# Patient Record
Sex: FEMALE | Race: WHITE | NOT HISPANIC OR LATINO | URBAN - METROPOLITAN AREA
[De-identification: names, ages, dates, MRNs, and addresses within clinical notes are randomized per-mention and may not be internally consistent; named-entity substitution may affect disease eponyms.]

---

## 2019-06-10 ENCOUNTER — EMERGENCY (EMERGENCY)
Facility: HOSPITAL | Age: 59
LOS: 1 days | Discharge: HOME | End: 2019-06-10
Attending: EMERGENCY MEDICINE
Payer: COMMERCIAL

## 2019-06-10 VITALS
WEIGHT: 166.89 LBS | SYSTOLIC BLOOD PRESSURE: 138 MMHG | DIASTOLIC BLOOD PRESSURE: 75 MMHG | HEART RATE: 58 BPM | HEIGHT: 64 IN | OXYGEN SATURATION: 100 % | TEMPERATURE: 98 F | RESPIRATION RATE: 16 BRPM

## 2019-06-10 VITALS
HEART RATE: 61 BPM | SYSTOLIC BLOOD PRESSURE: 107 MMHG | TEMPERATURE: 98 F | DIASTOLIC BLOOD PRESSURE: 60 MMHG | OXYGEN SATURATION: 99 % | RESPIRATION RATE: 16 BRPM

## 2019-06-10 PROCEDURE — 73600 X-RAY EXAM OF ANKLE: CPT

## 2019-06-10 PROCEDURE — 73590 X-RAY EXAM OF LOWER LEG: CPT | Mod: 26,RT,76

## 2019-06-10 PROCEDURE — 99285 EMERGENCY DEPT VISIT HI MDM: CPT | Mod: 25

## 2019-06-10 PROCEDURE — 99285 EMERGENCY DEPT VISIT HI MDM: CPT

## 2019-06-10 PROCEDURE — 73610 X-RAY EXAM OF ANKLE: CPT | Mod: 26,RT

## 2019-06-10 PROCEDURE — 73700 CT LOWER EXTREMITY W/O DYE: CPT

## 2019-06-10 PROCEDURE — 73562 X-RAY EXAM OF KNEE 3: CPT

## 2019-06-10 PROCEDURE — 27818 TREATMENT OF ANKLE FRACTURE: CPT | Mod: RT

## 2019-06-10 PROCEDURE — 76377 3D RENDER W/INTRP POSTPROCES: CPT | Mod: 26

## 2019-06-10 PROCEDURE — 73630 X-RAY EXAM OF FOOT: CPT

## 2019-06-10 PROCEDURE — 73630 X-RAY EXAM OF FOOT: CPT | Mod: 26,RT

## 2019-06-10 PROCEDURE — 73610 X-RAY EXAM OF ANKLE: CPT

## 2019-06-10 PROCEDURE — 73590 X-RAY EXAM OF LOWER LEG: CPT

## 2019-06-10 PROCEDURE — 99284 EMERGENCY DEPT VISIT MOD MDM: CPT

## 2019-06-10 PROCEDURE — 96374 THER/PROPH/DIAG INJ IV PUSH: CPT | Mod: XU

## 2019-06-10 PROCEDURE — 76377 3D RENDER W/INTRP POSTPROCES: CPT

## 2019-06-10 PROCEDURE — 73030 X-RAY EXAM OF SHOULDER: CPT | Mod: 26,RT

## 2019-06-10 PROCEDURE — 73562 X-RAY EXAM OF KNEE 3: CPT | Mod: 26,RT

## 2019-06-10 PROCEDURE — 73700 CT LOWER EXTREMITY W/O DYE: CPT | Mod: 26,RT

## 2019-06-10 PROCEDURE — 73030 X-RAY EXAM OF SHOULDER: CPT

## 2019-06-10 RX ORDER — ACETAMINOPHEN 500 MG
650 TABLET ORAL ONCE
Refills: 0 | Status: COMPLETED | OUTPATIENT
Start: 2019-06-10 | End: 2019-06-10

## 2019-06-10 RX ORDER — OXYCODONE HYDROCHLORIDE 5 MG/1
1 TABLET ORAL
Qty: 12 | Refills: 0
Start: 2019-06-10 | End: 2019-06-12

## 2019-06-10 RX ORDER — OXYCODONE HYDROCHLORIDE 5 MG/1
5 TABLET ORAL ONCE
Refills: 0 | Status: DISCONTINUED | OUTPATIENT
Start: 2019-06-10 | End: 2019-06-10

## 2019-06-10 RX ORDER — MORPHINE SULFATE 50 MG/1
4 CAPSULE, EXTENDED RELEASE ORAL ONCE
Refills: 0 | Status: DISCONTINUED | OUTPATIENT
Start: 2019-06-10 | End: 2019-06-10

## 2019-06-10 RX ADMIN — Medication 650 MILLIGRAM(S): at 12:17

## 2019-06-10 RX ADMIN — MORPHINE SULFATE 4 MILLIGRAM(S): 50 CAPSULE, EXTENDED RELEASE ORAL at 16:36

## 2019-06-10 RX ADMIN — OXYCODONE HYDROCHLORIDE 5 MILLIGRAM(S): 5 TABLET ORAL at 12:17

## 2019-06-10 RX ADMIN — Medication 650 MILLIGRAM(S): at 14:31

## 2019-06-10 RX ADMIN — MORPHINE SULFATE 4 MILLIGRAM(S): 50 CAPSULE, EXTENDED RELEASE ORAL at 14:31

## 2019-06-10 RX ADMIN — OXYCODONE HYDROCHLORIDE 5 MILLIGRAM(S): 5 TABLET ORAL at 14:31

## 2019-06-10 NOTE — ED PROVIDER NOTE - PHYSICAL EXAMINATION
General: well appearing, interactive, well nourished, no apparent distress  HEENT: EOMI, PERRLA, normal mucosa, normal oropharynx, no lesions on the lips or on oral mucosa, normal external ear  Neck: supple, no lymphadenopathy, full range of motion, no nuchal rigidity  CV: RRR, normal S1 and S2 with no murmur, capillary refill less than two seconds  Resp: lungs CTA b/l, good aeration bilaterally, symmetric chest wall   Abd: non-distended, soft, non-tender  : no CVA tenderness  MSK: r ankle circumferentially swollen, no ecchymoses or gross deformities, sensation intact to light touch in r foot, <2 second capillary refill in r foot, no gross deformities or ttp of r knee or tib/fib  Neuro: muscle strength 5/5 in all extremities  Skin: no rashes, skin intact

## 2019-06-10 NOTE — ED PROVIDER NOTE - CARE PLAN
Principal Discharge DX:	Ankle fracture Principal Discharge DX:	Ankle fracture  Goal:	Comminuted fracture of left distal fibula and tibia without dislocation

## 2019-06-10 NOTE — ED ADULT NURSE NOTE - OBJECTIVE STATEMENT
58 y.o female c c/o R ankle injury. Pt arrived via EMS. R ankle injury s/p falling over dog/baby gate. edema to R ankle injury noted. Pt able to move toes and no paresthesias noted. +pedal pulse.

## 2019-06-10 NOTE — ED PROVIDER NOTE - CARE PROVIDER_API CALL
French Hancock (MD)  Orthopaedic Surgery  [address] [place] [address], [address]  [place] [address]  Phone: [phone]  Fax: [phone]  Follow Up Time:

## 2019-06-10 NOTE — CONSULT NOTE ADULT - ASSESSMENT
A/P: 58yFemale with R ankle fracture s/p closed reduction and splinting  - Pain control  - NWB on affected extremity in splint  - Keep splint clean, dry and intact until follow up  - Cane/crutches/walker as needed  - Ice/elevation  - FU CT scan RLE  - Follow up with Dr. Hancock in 1 week at next available appointment. [phone]

## 2019-06-10 NOTE — ED PROVIDER NOTE - NSFOLLOWUPINSTRUCTIONS_ED_ALL_ED_FT
1) Please follow up with your Primary Care Provider in 24-48 hours  2) Seek immediate medical care for any new or returning symptoms including but not limited high fevers, severe pain, difficulty walking  3) Take Tylenol 650 mg every 4-6 hours as needed for pain. Do not take more than 2 grams within a 24 hour period  4) Take Ibuprofen 400-600 mg every 4-6 hours as needed for pain. Do not take more than 1200 mg within a 24 hour period. Take this medication with food  5) Take Oxycodone 5 mg every 6 hours as needed for breakthrough pain. Do not operate heavy machinery or make critical decisions when taking this medication 1) Please follow up with Orthopedics within 5-7 days  2) Seek immediate medical care for any new or returning symptoms including but not limited high fevers, severe pain, difficulty walking  3) Take Tylenol 650 mg every 4-6 hours as needed for pain. Do not take more than 2 grams within a 24 hour period  4) Take Ibuprofen 400-600 mg every 4-6 hours as needed for pain. Do not take more than 1200 mg within a 24 hour period. Take this medication with food  5) Take Oxycodone 5 mg every 6 hours as needed for breakthrough pain. Do not operate heavy machinery or make critical decisions when taking this medication

## 2019-06-10 NOTE — ED PROVIDER NOTE - NS ED ROS FT
GENERAL: No fever or chills, //             EYES: no change in vision, //             HEENT: no trouble swallowing or speaking, //             CARDIAC: no chest pain, //              PULMONARY: no cough or SOB, //             GI: no abdominal pain, no nausea or no vomiting, no diarrhea or constipation, //             : No changes in urination,  //            SKIN: no rashes,  //            NEURO: no headache,  //             MSK: r ankle pain. ~Timbo Matta DO PGY1

## 2019-06-10 NOTE — CONSULT NOTE ADULT - SUBJECTIVE AND OBJECTIVE BOX
Orthopedic Surgery Consult Note      57 y/o F community ambulator sp mechanical fall this morning while trying to get over a fence. She felt her right ankle twist and immediately felt pain and inability to bear weight. Denies numbness/tingling. Denies other injury. Denies syncope. Denies Head trauma        Vital Signs Last 24 Hrs  T(C): 36.8 (06-10-19 @ 11:02), Max: 36.8 (06-10-19 @ 11:02)  T(F): 98.2 (06-10-19 @ 11:02), Max: 98.2 (06-10-19 @ 11:02)  HR: 61 (06-10-19 @ 12:18) (58 - 61)  BP: 138/75 (06-10-19 @ 11:02) (138/75 - 138/75)  BP(mean): --  RR: 16 (06-10-19 @ 11:02) (16 - 16)  SpO2: 100% (06-10-19 @ 11:02) (100% - 100%)    Physical Exam  Gen: Nad  R/L LE: Skin intact, +skin tenting medially +TTP medial/lateral malleolus  motor intact distally  SILT s/s/sp/dp/t  2+ DP    Imaging:  XR showing trimalleolar R ankle fracture    Procedure: Closed reduction performed followed by placement of a well padded trilam splint. Patient tolerated the procedure well. Post procedure imaging obtained and showed improved alignment. Post procedure the patient was NV intact.    A/P: 58yFemale with R ankle fracture s/p closed reduction and splinting  - Pain control  - NWB on affected extremity in splint  - Keep splint clean, dry and intact until follow up  - Cane/crutches/walker as needed  - Ice/elevation  - CU CT scan RLE Orthopedic Surgery Consult Note      57 y/o F community ambulator sp mechanical fall this morning while trying to get over a fence. She felt her right ankle twist and immediately felt pain and inability to bear weight. Denies numbness/tingling. Denies other injury. Denies syncope. Denies Head trauma        Vital Signs Last 24 Hrs  T(C): 36.8 (06-10-19 @ 11:02), Max: 36.8 (06-10-19 @ 11:02)  T(F): 98.2 (06-10-19 @ 11:02), Max: 98.2 (06-10-19 @ 11:02)  HR: 61 (06-10-19 @ 12:18) (58 - 61)  BP: 138/75 (06-10-19 @ 11:02) (138/75 - 138/75)  BP(mean): --  RR: 16 (06-10-19 @ 11:02) (16 - 16)  SpO2: 100% (06-10-19 @ 11:02) (100% - 100%)    Physical Exam  Gen: Nad  R/L LE: Skin intact, +skin tenting medially +TTP medial/lateral malleolus  motor intact distally  SILT s/s/sp/dp/t  2+ DP    Imaging:  XR showing trimalleolar R ankle fracture    Procedure: Closed reduction performed followed by placement of a well padded trilam splint. Patient tolerated the procedure well. Post procedure imaging obtained and showed improved alignment. Post procedure the patient was NV intact.

## 2019-06-10 NOTE — ED ADULT NURSE NOTE - PMH
Cardiac murmur    Cervical cancer  s/p LEEP procedure 8/12  Chronic obstructive pulmonary disease (COPD)    Herniated disc    Prediabetes    Torn meniscus

## 2019-06-10 NOTE — ED ADULT NURSE NOTE - PSH
S/P appendectomy  age 14  S/P arthroscopy of left knee  multiple  S/P knee surgery  20+ years ago high fibula osteotomy left  S/P laparotomy  ovarian cystectomy  S/P TKR (total knee replacement)  left 12/13/12

## 2019-06-10 NOTE — ED PROVIDER NOTE - PROGRESS NOTE DETAILS
Patient reassessed, NAD, non-toxic appearing. tri-mal frac. ortho consulted. more pain medication given. splinted. will order ct per ortho.   - Timbo Matta D.O. PGY1 Patient reassessed, NAD, non-toxic appearing. ambulatory with crutches. no admission per ortho. to f/u. [place] with strict return precautions.

## 2019-06-10 NOTE — ED PROVIDER NOTE - OBJECTIVE STATEMENT
59 yo f pmh ibm, pw r ankle pain. pt states this morning she suffered mechanical fall at home, causing a potential inversion injury to r ankle. states she felt immediate pain to area and a "pop". pain is 10/10, localized to ankle, worse with pressure, better with rest. denies syncope, head trauma, n/v, f/c, cp, sob.

## 2019-06-10 NOTE — ED PROVIDER NOTE - CLINICAL SUMMARY MEDICAL DECISION MAKING FREE TEXT BOX
59 yo f pw traumatic, mechanical, non-syncopal fall with r ankle pain. xray, pain control. reassess.

## 2019-06-10 NOTE — ED PROVIDER NOTE - ATTENDING CONTRIBUTION TO CARE
I have seen and evaluated this patient with the resident.   I agree with the findings  unless other wise stated.  I have made appropriate changes in documentations where needed, After my face to face bedside evaluation, I am further  [age] yo f pmh ibm, pw r ankle pain. pt states this morning she suffered mechanical fall at home, causing a potential inversion injury to r ankle. states she felt immediate pain to area and a "pop". pain is 10/10, localized to ankle, worse with pressure, better with rest. denies syncope, head trauma, n/v, f/c, cp, sob. 57 yo f pw traumatic, mechanical, non-syncopal fall with r ankle pain. xray, pain control. reassess. ankle swelling tender unstable unable to bear weight xrays and CT scans review Pt will be d/c and will have eval and surgery done by Dr Hancock  --Sauceda

## 2019-06-14 ENCOUNTER — TRANSCRIPTION ENCOUNTER (OUTPATIENT)
Age: 59
End: 2019-06-14

## 2019-06-14 RX ORDER — CEFAZOLIN SODIUM 1 G
2000 VIAL (EA) INJECTION ONCE
Refills: 0 | Status: DISCONTINUED | OUTPATIENT
Start: 2019-06-15 | End: 2019-06-18

## 2019-06-15 ENCOUNTER — INPATIENT (INPATIENT)
Facility: HOSPITAL | Age: 59
LOS: 2 days | Discharge: HOME | DRG: 494 | End: 2019-06-18
Attending: ORTHOPAEDIC SURGERY | Admitting: ORTHOPAEDIC SURGERY
Payer: COMMERCIAL

## 2019-06-15 VITALS
TEMPERATURE: 98 F | OXYGEN SATURATION: 97 % | SYSTOLIC BLOOD PRESSURE: 135 MMHG | WEIGHT: 166.01 LBS | HEART RATE: 83 BPM | DIASTOLIC BLOOD PRESSURE: 78 MMHG | RESPIRATION RATE: 18 BRPM

## 2019-06-15 DIAGNOSIS — S82.851A DISPLACED TRIMALLEOLAR FRACTURE OF RIGHT LOWER LEG, INITIAL ENCOUNTER FOR CLOSED FRACTURE: ICD-10-CM

## 2019-06-15 LAB
ANION GAP SERPL CALC-SCNC: 13 MMOL/L — SIGNIFICANT CHANGE UP (ref 5–17)
BASE EXCESS BLDV CALC-SCNC: 3.2 MMOL/L — HIGH (ref -2–2)
BASOPHILS # BLD AUTO: 0 K/UL — SIGNIFICANT CHANGE UP (ref 0–0.2)
BASOPHILS NFR BLD AUTO: 0 % — SIGNIFICANT CHANGE UP (ref 0–2)
BUN SERPL-MCNC: 15 MG/DL — SIGNIFICANT CHANGE UP (ref 7–23)
CA-I SERPL-SCNC: 1.11 MMOL/L — LOW (ref 1.12–1.3)
CALCIUM SERPL-MCNC: 9 MG/DL — SIGNIFICANT CHANGE UP (ref 8.4–10.5)
CHLORIDE BLDV-SCNC: 106 MMOL/L — SIGNIFICANT CHANGE UP (ref 96–108)
CHLORIDE SERPL-SCNC: 99 MMOL/L — SIGNIFICANT CHANGE UP (ref 96–108)
CO2 BLDV-SCNC: 29 MMOL/L — SIGNIFICANT CHANGE UP (ref 22–30)
CO2 SERPL-SCNC: 25 MMOL/L — SIGNIFICANT CHANGE UP (ref 22–31)
CREAT SERPL-MCNC: 0.66 MG/DL — SIGNIFICANT CHANGE UP (ref 0.5–1.3)
EOSINOPHIL # BLD AUTO: 0 K/UL — SIGNIFICANT CHANGE UP (ref 0–0.5)
EOSINOPHIL NFR BLD AUTO: 0.2 % — SIGNIFICANT CHANGE UP (ref 0–6)
GAS PNL BLDV: 134 MMOL/L — LOW (ref 136–145)
GAS PNL BLDV: SIGNIFICANT CHANGE UP
GAS PNL BLDV: SIGNIFICANT CHANGE UP
GLUCOSE BLDC GLUCOMTR-MCNC: 116 MG/DL — HIGH (ref 70–99)
GLUCOSE BLDV-MCNC: 113 MG/DL — HIGH (ref 70–99)
GLUCOSE SERPL-MCNC: 172 MG/DL — HIGH (ref 70–99)
HCO3 BLDV-SCNC: 28 MMOL/L — SIGNIFICANT CHANGE UP (ref 21–29)
HCT VFR BLD CALC: 38 % — SIGNIFICANT CHANGE UP (ref 34.5–45)
HCT VFR BLDA CALC: 38 % — LOW (ref 39–50)
HGB BLD CALC-MCNC: 12.2 G/DL — SIGNIFICANT CHANGE UP (ref 11.5–15.5)
HGB BLD-MCNC: 13.1 G/DL — SIGNIFICANT CHANGE UP (ref 11.5–15.5)
LACTATE BLDV-MCNC: 1 MMOL/L — SIGNIFICANT CHANGE UP (ref 0.7–2)
LYMPHOCYTES # BLD AUTO: 0.4 K/UL — LOW (ref 1–3.3)
LYMPHOCYTES # BLD AUTO: 4.3 % — LOW (ref 13–44)
MCHC RBC-ENTMCNC: 30.8 PG — SIGNIFICANT CHANGE UP (ref 27–34)
MCHC RBC-ENTMCNC: 34.3 GM/DL — SIGNIFICANT CHANGE UP (ref 32–36)
MCV RBC AUTO: 89.7 FL — SIGNIFICANT CHANGE UP (ref 80–100)
MONOCYTES # BLD AUTO: 0.1 K/UL — SIGNIFICANT CHANGE UP (ref 0–0.9)
MONOCYTES NFR BLD AUTO: 1.2 % — LOW (ref 2–14)
NEUTROPHILS # BLD AUTO: 9.5 K/UL — HIGH (ref 1.8–7.4)
NEUTROPHILS NFR BLD AUTO: 94.2 % — HIGH (ref 43–77)
OTHER CELLS CSF MANUAL: 16 ML/DL — LOW (ref 18–22)
PCO2 BLDV: 44 MMHG — SIGNIFICANT CHANGE UP (ref 35–50)
PH BLDV: 7.42 — SIGNIFICANT CHANGE UP (ref 7.35–7.45)
PLATELET # BLD AUTO: 288 K/UL — SIGNIFICANT CHANGE UP (ref 150–400)
PO2 BLDV: 86 MMHG — HIGH (ref 25–45)
POTASSIUM BLDV-SCNC: 3.8 MMOL/L — SIGNIFICANT CHANGE UP (ref 3.5–5.3)
POTASSIUM SERPL-MCNC: 4.3 MMOL/L — SIGNIFICANT CHANGE UP (ref 3.5–5.3)
POTASSIUM SERPL-SCNC: 4.3 MMOL/L — SIGNIFICANT CHANGE UP (ref 3.5–5.3)
RBC # BLD: 4.24 M/UL — SIGNIFICANT CHANGE UP (ref 3.8–5.2)
RBC # FLD: 10.9 % — SIGNIFICANT CHANGE UP (ref 10.3–14.5)
SAO2 % BLDV: 97 % — HIGH (ref 67–88)
SODIUM SERPL-SCNC: 137 MMOL/L — SIGNIFICANT CHANGE UP (ref 135–145)
WBC # BLD: 10 K/UL — SIGNIFICANT CHANGE UP (ref 3.8–10.5)
WBC # FLD AUTO: 10 K/UL — SIGNIFICANT CHANGE UP (ref 3.8–10.5)

## 2019-06-15 PROCEDURE — 73610 X-RAY EXAM OF ANKLE: CPT | Mod: 26,RT

## 2019-06-15 RX ORDER — ONDANSETRON 8 MG/1
4 TABLET, FILM COATED ORAL ONCE
Refills: 0 | Status: DISCONTINUED | OUTPATIENT
Start: 2019-06-15 | End: 2019-06-15

## 2019-06-15 RX ORDER — HYDROMORPHONE HYDROCHLORIDE 2 MG/ML
0.5 INJECTION INTRAMUSCULAR; INTRAVENOUS; SUBCUTANEOUS
Refills: 0 | Status: DISCONTINUED | OUTPATIENT
Start: 2019-06-15 | End: 2019-06-15

## 2019-06-15 RX ORDER — OXYCODONE HYDROCHLORIDE 5 MG/1
5 TABLET ORAL EVERY 4 HOURS
Refills: 0 | Status: DISCONTINUED | OUTPATIENT
Start: 2019-06-15 | End: 2019-06-18

## 2019-06-15 RX ORDER — SODIUM CHLORIDE 9 MG/ML
1000 INJECTION, SOLUTION INTRAVENOUS
Refills: 0 | Status: DISCONTINUED | OUTPATIENT
Start: 2019-06-15 | End: 2019-06-18

## 2019-06-15 RX ORDER — ASPIRIN/CALCIUM CARB/MAGNESIUM 324 MG
325 TABLET ORAL
Refills: 0 | Status: DISCONTINUED | OUTPATIENT
Start: 2019-06-15 | End: 2019-06-18

## 2019-06-15 RX ORDER — SODIUM CHLORIDE 9 MG/ML
3 INJECTION INTRAMUSCULAR; INTRAVENOUS; SUBCUTANEOUS EVERY 8 HOURS
Refills: 0 | Status: DISCONTINUED | OUTPATIENT
Start: 2019-06-15 | End: 2019-06-15

## 2019-06-15 RX ORDER — ONDANSETRON 8 MG/1
4 TABLET, FILM COATED ORAL EVERY 6 HOURS
Refills: 0 | Status: DISCONTINUED | OUTPATIENT
Start: 2019-06-15 | End: 2019-06-15

## 2019-06-15 RX ORDER — MAGNESIUM HYDROXIDE 400 MG/1
30 TABLET, CHEWABLE ORAL DAILY
Refills: 0 | Status: DISCONTINUED | OUTPATIENT
Start: 2019-06-15 | End: 2019-06-18

## 2019-06-15 RX ORDER — OXYCODONE HYDROCHLORIDE 5 MG/1
10 TABLET ORAL EVERY 4 HOURS
Refills: 0 | Status: DISCONTINUED | OUTPATIENT
Start: 2019-06-15 | End: 2019-06-18

## 2019-06-15 RX ORDER — ACETAMINOPHEN 500 MG
650 TABLET ORAL ONCE
Refills: 0 | Status: DISCONTINUED | OUTPATIENT
Start: 2019-06-15 | End: 2019-06-16

## 2019-06-15 RX ORDER — CEFAZOLIN SODIUM 1 G
2000 VIAL (EA) INJECTION EVERY 8 HOURS
Refills: 0 | Status: COMPLETED | OUTPATIENT
Start: 2019-06-15 | End: 2019-06-16

## 2019-06-15 RX ORDER — HYDROMORPHONE HYDROCHLORIDE 2 MG/ML
1 INJECTION INTRAMUSCULAR; INTRAVENOUS; SUBCUTANEOUS
Refills: 0 | Status: DISCONTINUED | OUTPATIENT
Start: 2019-06-15 | End: 2019-06-15

## 2019-06-15 RX ORDER — ONDANSETRON 8 MG/1
4 TABLET, FILM COATED ORAL EVERY 6 HOURS
Refills: 0 | Status: DISCONTINUED | OUTPATIENT
Start: 2019-06-15 | End: 2019-06-18

## 2019-06-15 RX ORDER — DOCUSATE SODIUM 100 MG
100 CAPSULE ORAL THREE TIMES A DAY
Refills: 0 | Status: DISCONTINUED | OUTPATIENT
Start: 2019-06-15 | End: 2019-06-18

## 2019-06-15 RX ORDER — HYDROMORPHONE HYDROCHLORIDE 2 MG/ML
30 INJECTION INTRAMUSCULAR; INTRAVENOUS; SUBCUTANEOUS
Refills: 0 | Status: DISCONTINUED | OUTPATIENT
Start: 2019-06-15 | End: 2019-06-15

## 2019-06-15 RX ORDER — MORPHINE SULFATE 50 MG/1
2 CAPSULE, EXTENDED RELEASE ORAL EVERY 4 HOURS
Refills: 0 | Status: DISCONTINUED | OUTPATIENT
Start: 2019-06-15 | End: 2019-06-18

## 2019-06-15 RX ORDER — NALOXONE HYDROCHLORIDE 4 MG/.1ML
0.1 SPRAY NASAL
Refills: 0 | Status: DISCONTINUED | OUTPATIENT
Start: 2019-06-15 | End: 2019-06-15

## 2019-06-15 RX ADMIN — OXYCODONE HYDROCHLORIDE 10 MILLIGRAM(S): 5 TABLET ORAL at 17:08

## 2019-06-15 RX ADMIN — OXYCODONE HYDROCHLORIDE 10 MILLIGRAM(S): 5 TABLET ORAL at 23:50

## 2019-06-15 RX ADMIN — OXYCODONE HYDROCHLORIDE 10 MILLIGRAM(S): 5 TABLET ORAL at 17:40

## 2019-06-15 RX ADMIN — HYDROMORPHONE HYDROCHLORIDE 1 MILLIGRAM(S): 2 INJECTION INTRAMUSCULAR; INTRAVENOUS; SUBCUTANEOUS at 16:20

## 2019-06-15 RX ADMIN — HYDROMORPHONE HYDROCHLORIDE 1 MILLIGRAM(S): 2 INJECTION INTRAMUSCULAR; INTRAVENOUS; SUBCUTANEOUS at 16:02

## 2019-06-15 RX ADMIN — Medication 100 MILLIGRAM(S): at 20:57

## 2019-06-15 RX ADMIN — Medication 325 MILLIGRAM(S): at 17:15

## 2019-06-15 RX ADMIN — ONDANSETRON 4 MILLIGRAM(S): 8 TABLET, FILM COATED ORAL at 21:39

## 2019-06-15 RX ADMIN — HYDROMORPHONE HYDROCHLORIDE 0.5 MILLIGRAM(S): 2 INJECTION INTRAMUSCULAR; INTRAVENOUS; SUBCUTANEOUS at 15:54

## 2019-06-15 RX ADMIN — HYDROMORPHONE HYDROCHLORIDE 0.5 MILLIGRAM(S): 2 INJECTION INTRAMUSCULAR; INTRAVENOUS; SUBCUTANEOUS at 16:10

## 2019-06-15 RX ADMIN — HYDROMORPHONE HYDROCHLORIDE 1 MILLIGRAM(S): 2 INJECTION INTRAMUSCULAR; INTRAVENOUS; SUBCUTANEOUS at 16:47

## 2019-06-15 RX ADMIN — HYDROMORPHONE HYDROCHLORIDE 1 MILLIGRAM(S): 2 INJECTION INTRAMUSCULAR; INTRAVENOUS; SUBCUTANEOUS at 17:02

## 2019-06-15 RX ADMIN — OXYCODONE HYDROCHLORIDE 10 MILLIGRAM(S): 5 TABLET ORAL at 23:20

## 2019-06-15 NOTE — CHART NOTE - NSCHARTNOTEFT_GEN_A_CORE
Surgery Postop Check    S: Patient underwent ORIF of right ankle, tolerated the procedure without issue, and was sent to PACU in stable condition.  Patient complaining of nausea postoperatively, improved with medication.  Denies chest pain, shortness of breath, lightheadedness, or dizziness.  Complaining of pain but well controlled.     O: T(C): 36.5 (06-15-19 @ 21:40), Max: 36.8 (06-15-19 @ 18:40)  HR: 65 (06-15-19 @ 21:40) (65 - 96)  BP: 107/71 (06-15-19 @ 21:40) (107/66 - 135/65)  RR: 19 (06-15-19 @ 21:40) (12 - 20)  SpO2: 94% (06-15-19 @ 21:40) (93% - 99%)  Wt(kg): --                        13.1   10.0  )-----------( 288      ( 15 Jin 2019 16:22 )             38.0        06-15    137  |  99  |  15  ----------------------------<  172<H>  4.3   |  25  |  0.66    Ca    9.0      15 Jin 2019 16:22        Physical exam:  Gen: NAD, resting comfortably in bed  Resp: no increased WOB on RA  RLE: splint c/d/i, wiggles toes, cap refill <2s distally      Assessment/Plan:  58y Female s/p R ankle ORIF, recovering postoperatively    Pain control  Reg Diet  DVT PPX  NWB RLE  Periop ancef  Out of bed and encourage early ambulation  Incentive spirometry

## 2019-06-16 LAB
ANION GAP SERPL CALC-SCNC: 13 MMOL/L — SIGNIFICANT CHANGE UP (ref 5–17)
BUN SERPL-MCNC: 14 MG/DL — SIGNIFICANT CHANGE UP (ref 7–23)
CALCIUM SERPL-MCNC: 8.9 MG/DL — SIGNIFICANT CHANGE UP (ref 8.4–10.5)
CHLORIDE SERPL-SCNC: 100 MMOL/L — SIGNIFICANT CHANGE UP (ref 96–108)
CO2 SERPL-SCNC: 28 MMOL/L — SIGNIFICANT CHANGE UP (ref 22–31)
CREAT SERPL-MCNC: 0.62 MG/DL — SIGNIFICANT CHANGE UP (ref 0.5–1.3)
GLUCOSE SERPL-MCNC: 117 MG/DL — HIGH (ref 70–99)
HCT VFR BLD CALC: 19.6 % — CRITICAL LOW (ref 34.5–45)
HCT VFR BLD CALC: 33.5 % — LOW (ref 34.5–45)
HGB BLD-MCNC: 11.2 G/DL — LOW (ref 11.5–15.5)
HGB BLD-MCNC: 5.9 G/DL — CRITICAL LOW (ref 11.5–15.5)
MCHC RBC-ENTMCNC: 29.5 PG — SIGNIFICANT CHANGE UP (ref 27–34)
MCHC RBC-ENTMCNC: 30.1 GM/DL — LOW (ref 32–36)
MCHC RBC-ENTMCNC: 30.3 PG — SIGNIFICANT CHANGE UP (ref 27–34)
MCHC RBC-ENTMCNC: 33.5 GM/DL — SIGNIFICANT CHANGE UP (ref 32–36)
MCV RBC AUTO: 90.4 FL — SIGNIFICANT CHANGE UP (ref 80–100)
MCV RBC AUTO: 98 FL — SIGNIFICANT CHANGE UP (ref 80–100)
PLATELET # BLD AUTO: 134 K/UL — LOW (ref 150–400)
PLATELET # BLD AUTO: 268 K/UL — SIGNIFICANT CHANGE UP (ref 150–400)
POTASSIUM SERPL-MCNC: 4.4 MMOL/L — SIGNIFICANT CHANGE UP (ref 3.5–5.3)
POTASSIUM SERPL-SCNC: 4.4 MMOL/L — SIGNIFICANT CHANGE UP (ref 3.5–5.3)
RBC # BLD: 2 M/UL — LOW (ref 3.8–5.2)
RBC # BLD: 3.7 M/UL — LOW (ref 3.8–5.2)
RBC # FLD: 11 % — SIGNIFICANT CHANGE UP (ref 10.3–14.5)
RBC # FLD: 11.9 % — SIGNIFICANT CHANGE UP (ref 10.3–14.5)
SODIUM SERPL-SCNC: 141 MMOL/L — SIGNIFICANT CHANGE UP (ref 135–145)
WBC # BLD: 5.96 K/UL — SIGNIFICANT CHANGE UP (ref 3.8–10.5)
WBC # BLD: 9.6 K/UL — SIGNIFICANT CHANGE UP (ref 3.8–10.5)
WBC # FLD AUTO: 5.96 K/UL — SIGNIFICANT CHANGE UP (ref 3.8–10.5)
WBC # FLD AUTO: 9.6 K/UL — SIGNIFICANT CHANGE UP (ref 3.8–10.5)

## 2019-06-16 RX ORDER — ACETAMINOPHEN 500 MG
1000 TABLET ORAL ONCE
Refills: 0 | Status: COMPLETED | OUTPATIENT
Start: 2019-06-16 | End: 2019-06-16

## 2019-06-16 RX ORDER — PANTOPRAZOLE SODIUM 20 MG/1
40 TABLET, DELAYED RELEASE ORAL
Refills: 0 | Status: DISCONTINUED | OUTPATIENT
Start: 2019-06-16 | End: 2019-06-18

## 2019-06-16 RX ORDER — SENNA PLUS 8.6 MG/1
2 TABLET ORAL AT BEDTIME
Refills: 0 | Status: DISCONTINUED | OUTPATIENT
Start: 2019-06-16 | End: 2019-06-18

## 2019-06-16 RX ORDER — TRAMADOL HYDROCHLORIDE 50 MG/1
50 TABLET ORAL EVERY 6 HOURS
Refills: 0 | Status: DISCONTINUED | OUTPATIENT
Start: 2019-06-18 | End: 2019-06-18

## 2019-06-16 RX ORDER — TRAMADOL HYDROCHLORIDE 50 MG/1
50 TABLET ORAL EVERY 6 HOURS
Refills: 0 | Status: DISCONTINUED | OUTPATIENT
Start: 2019-06-16 | End: 2019-06-18

## 2019-06-16 RX ORDER — ACETAMINOPHEN 500 MG
975 TABLET ORAL EVERY 6 HOURS
Refills: 0 | Status: DISCONTINUED | OUTPATIENT
Start: 2019-06-17 | End: 2019-06-18

## 2019-06-16 RX ADMIN — Medication 400 MILLIGRAM(S): at 20:04

## 2019-06-16 RX ADMIN — Medication 1000 MILLIGRAM(S): at 14:50

## 2019-06-16 RX ADMIN — SODIUM CHLORIDE 125 MILLILITER(S): 9 INJECTION, SOLUTION INTRAVENOUS at 23:36

## 2019-06-16 RX ADMIN — MORPHINE SULFATE 2 MILLIGRAM(S): 50 CAPSULE, EXTENDED RELEASE ORAL at 17:05

## 2019-06-16 RX ADMIN — MORPHINE SULFATE 2 MILLIGRAM(S): 50 CAPSULE, EXTENDED RELEASE ORAL at 21:20

## 2019-06-16 RX ADMIN — Medication 325 MILLIGRAM(S): at 05:53

## 2019-06-16 RX ADMIN — MORPHINE SULFATE 2 MILLIGRAM(S): 50 CAPSULE, EXTENDED RELEASE ORAL at 03:09

## 2019-06-16 RX ADMIN — Medication 1000 MILLIGRAM(S): at 08:45

## 2019-06-16 RX ADMIN — OXYCODONE HYDROCHLORIDE 10 MILLIGRAM(S): 5 TABLET ORAL at 11:02

## 2019-06-16 RX ADMIN — Medication 400 MILLIGRAM(S): at 08:03

## 2019-06-16 RX ADMIN — OXYCODONE HYDROCHLORIDE 10 MILLIGRAM(S): 5 TABLET ORAL at 04:46

## 2019-06-16 RX ADMIN — TRAMADOL HYDROCHLORIDE 50 MILLIGRAM(S): 50 TABLET ORAL at 10:51

## 2019-06-16 RX ADMIN — SODIUM CHLORIDE 125 MILLILITER(S): 9 INJECTION, SOLUTION INTRAVENOUS at 17:08

## 2019-06-16 RX ADMIN — TRAMADOL HYDROCHLORIDE 50 MILLIGRAM(S): 50 TABLET ORAL at 22:02

## 2019-06-16 RX ADMIN — OXYCODONE HYDROCHLORIDE 10 MILLIGRAM(S): 5 TABLET ORAL at 11:45

## 2019-06-16 RX ADMIN — OXYCODONE HYDROCHLORIDE 10 MILLIGRAM(S): 5 TABLET ORAL at 23:36

## 2019-06-16 RX ADMIN — MORPHINE SULFATE 2 MILLIGRAM(S): 50 CAPSULE, EXTENDED RELEASE ORAL at 02:54

## 2019-06-16 RX ADMIN — Medication 100 MILLIGRAM(S): at 13:40

## 2019-06-16 RX ADMIN — MORPHINE SULFATE 2 MILLIGRAM(S): 50 CAPSULE, EXTENDED RELEASE ORAL at 16:20

## 2019-06-16 RX ADMIN — Medication 400 MILLIGRAM(S): at 13:40

## 2019-06-16 RX ADMIN — SENNA PLUS 2 TABLET(S): 8.6 TABLET ORAL at 22:02

## 2019-06-16 RX ADMIN — Medication 1000 MILLIGRAM(S): at 20:05

## 2019-06-16 RX ADMIN — TRAMADOL HYDROCHLORIDE 50 MILLIGRAM(S): 50 TABLET ORAL at 15:46

## 2019-06-16 RX ADMIN — Medication 100 MILLIGRAM(S): at 05:53

## 2019-06-16 RX ADMIN — SODIUM CHLORIDE 125 MILLILITER(S): 9 INJECTION, SOLUTION INTRAVENOUS at 05:54

## 2019-06-16 RX ADMIN — OXYCODONE HYDROCHLORIDE 10 MILLIGRAM(S): 5 TABLET ORAL at 15:47

## 2019-06-16 RX ADMIN — OXYCODONE HYDROCHLORIDE 10 MILLIGRAM(S): 5 TABLET ORAL at 18:50

## 2019-06-16 RX ADMIN — TRAMADOL HYDROCHLORIDE 50 MILLIGRAM(S): 50 TABLET ORAL at 16:22

## 2019-06-16 RX ADMIN — OXYCODONE HYDROCHLORIDE 10 MILLIGRAM(S): 5 TABLET ORAL at 04:16

## 2019-06-16 RX ADMIN — Medication 325 MILLIGRAM(S): at 19:04

## 2019-06-16 RX ADMIN — OXYCODONE HYDROCHLORIDE 10 MILLIGRAM(S): 5 TABLET ORAL at 19:20

## 2019-06-16 RX ADMIN — Medication 100 MILLIGRAM(S): at 22:02

## 2019-06-16 RX ADMIN — Medication 100 MILLIGRAM(S): at 04:20

## 2019-06-16 RX ADMIN — TRAMADOL HYDROCHLORIDE 50 MILLIGRAM(S): 50 TABLET ORAL at 09:59

## 2019-06-16 RX ADMIN — OXYCODONE HYDROCHLORIDE 10 MILLIGRAM(S): 5 TABLET ORAL at 14:52

## 2019-06-16 RX ADMIN — MORPHINE SULFATE 2 MILLIGRAM(S): 50 CAPSULE, EXTENDED RELEASE ORAL at 21:05

## 2019-06-16 RX ADMIN — TRAMADOL HYDROCHLORIDE 50 MILLIGRAM(S): 50 TABLET ORAL at 22:33

## 2019-06-16 NOTE — PHYSICAL THERAPY INITIAL EVALUATION ADULT - ACTIVE RANGE OF MOTION EXAMINATION, REHAB EVAL
Left LE Active ROM was WFL (within functional limits)/Right LE Active ROM was WFL (within functional limits)/Left UE Active ROM was WFL (within functional limits)/Right UE Active ROM was WFL (within functional limits)

## 2019-06-16 NOTE — PHYSICAL THERAPY INITIAL EVALUATION ADULT - DISCHARGE DISPOSITION, PT EVAL
home w/ assist/outpatient PT home w/ assist/outpatient PT/Plan for Outpatient PT when deemed medically appropriate. home w/ assist/outpatient PT/Plan for Outpatient PT when deemed medically appropriate. Assist from spouse as needed

## 2019-06-16 NOTE — PROGRESS NOTE ADULT - SUBJECTIVE AND OBJECTIVE BOX
Patient resting without complaints.  No chest pain, SOB, N/V.    T(C): 36.7 (06-16-19 @ 04:20), Max: 36.9 (06-15-19 @ 23:59)  HR: 74 (06-16-19 @ 04:20) (65 - 96)  BP: 117/69 (06-16-19 @ 04:20) (107/66 - 135/65)  RR: 19 (06-16-19 @ 04:20) (12 - 20)  SpO2: 97% (06-16-19 @ 04:20) (93% - 99%)      Exam:  Alert and Oriented, No Acute Distress  Cards: +S1/S2, RRR  Pulm: CTAB  Lower Extremities:  RLE: Post Splint/ACE Bandage in place, Toes warm and mobile with brisk cap refill  SILT  +DP Pulse                              13.1   10.0  )-----------( 288      ( 15 Jin 2019 16:22 )             38.0    06-15    137  |  99  |  15  ----------------------------<  172<H>  4.3   |  25  |  0.66    Ca    9.0      15 Jin 2019 16:22

## 2019-06-16 NOTE — PROGRESS NOTE ADULT - ASSESSMENT
A/P: 57 y/o F POD# 1 S/P R Trimal ORIF    DVT ppx- ASA 325mg PO BID  RLE NWB  PT  GI ppx  Pain management   FU AM labs    AMENA Aleman  Orthopedic Surgery  [phone]

## 2019-06-16 NOTE — PHYSICAL THERAPY INITIAL EVALUATION ADULT - PERTINENT HX OF CURRENT PROBLEM, REHAB EVAL
57 yo F pmh ibm, pw r ankle pain. pt states she suffered mechanical fall at home, causing a potential inversion injury to R ankle. states she felt immediate pain to area and a "pop". pain is 10/10. X-ray R ankle 6/10: mildly comminuted spiral fracture of the distal fibular shaft located proximal to the tibiotalar joint line.

## 2019-06-16 NOTE — PHYSICAL THERAPY INITIAL EVALUATION ADULT - ASR EQUIP NEEDS DISCH PT EVAL
Pt may benefit from transport chair and R/W upon D/C home DME: R/W, transport chair with elevating leg rests, and bedside commode. Pt owns chairlift, ramp, tub transfer bench, elevated toilet seat. Assist from spouse as needed. Pt aware and in agreement. CM [place] to be made aware. DME: R/W, transport chair with elevating leg rests, and bedside commode. Pt owns chairlift, ramp, tub transfer bench, elevated toilet seat

## 2019-06-16 NOTE — PHYSICAL THERAPY INITIAL EVALUATION ADULT - PRECAUTIONS/LIMITATIONS, REHAB EVAL
+fx of the medial malleolus. Mildly displaced fracture of the posterior aspect of the distal tibia with intra-articular extension. Cortical disruption of the anterior aspect of distal tibia. Soft tissue swelling about the ankle. No dislocation.  No acute fracture is seen in the proximal tibia and fibula. X-ray R knee (-). X-ray R elif: (-) X-ray R foot: Trimalleolar ankle fracture as demonstrated on dedicated ankle radiographs. No acute fracture or dislocation is seen in the right foot. Enthesophyte at the distal Achilles tendon insertion. Pt now s/p R ankle ORIF on 6/15/fall precautions

## 2019-06-17 ENCOUNTER — TRANSCRIPTION ENCOUNTER (OUTPATIENT)
Age: 59
End: 2019-06-17

## 2019-06-17 RX ADMIN — OXYCODONE HYDROCHLORIDE 10 MILLIGRAM(S): 5 TABLET ORAL at 21:46

## 2019-06-17 RX ADMIN — TRAMADOL HYDROCHLORIDE 50 MILLIGRAM(S): 50 TABLET ORAL at 03:39

## 2019-06-17 RX ADMIN — Medication 975 MILLIGRAM(S): at 14:32

## 2019-06-17 RX ADMIN — Medication 100 MILLIGRAM(S): at 05:45

## 2019-06-17 RX ADMIN — OXYCODONE HYDROCHLORIDE 5 MILLIGRAM(S): 5 TABLET ORAL at 12:39

## 2019-06-17 RX ADMIN — OXYCODONE HYDROCHLORIDE 10 MILLIGRAM(S): 5 TABLET ORAL at 16:51

## 2019-06-17 RX ADMIN — TRAMADOL HYDROCHLORIDE 50 MILLIGRAM(S): 50 TABLET ORAL at 04:10

## 2019-06-17 RX ADMIN — TRAMADOL HYDROCHLORIDE 50 MILLIGRAM(S): 50 TABLET ORAL at 10:00

## 2019-06-17 RX ADMIN — Medication 975 MILLIGRAM(S): at 01:45

## 2019-06-17 RX ADMIN — Medication 325 MILLIGRAM(S): at 18:31

## 2019-06-17 RX ADMIN — MORPHINE SULFATE 2 MILLIGRAM(S): 50 CAPSULE, EXTENDED RELEASE ORAL at 01:11

## 2019-06-17 RX ADMIN — OXYCODONE HYDROCHLORIDE 10 MILLIGRAM(S): 5 TABLET ORAL at 17:20

## 2019-06-17 RX ADMIN — Medication 975 MILLIGRAM(S): at 07:53

## 2019-06-17 RX ADMIN — SENNA PLUS 2 TABLET(S): 8.6 TABLET ORAL at 21:47

## 2019-06-17 RX ADMIN — OXYCODONE HYDROCHLORIDE 5 MILLIGRAM(S): 5 TABLET ORAL at 03:10

## 2019-06-17 RX ADMIN — Medication 100 MILLIGRAM(S): at 14:33

## 2019-06-17 RX ADMIN — PANTOPRAZOLE SODIUM 40 MILLIGRAM(S): 20 TABLET, DELAYED RELEASE ORAL at 05:55

## 2019-06-17 RX ADMIN — Medication 975 MILLIGRAM(S): at 08:25

## 2019-06-17 RX ADMIN — Medication 975 MILLIGRAM(S): at 01:12

## 2019-06-17 RX ADMIN — Medication 975 MILLIGRAM(S): at 15:00

## 2019-06-17 RX ADMIN — MORPHINE SULFATE 2 MILLIGRAM(S): 50 CAPSULE, EXTENDED RELEASE ORAL at 14:31

## 2019-06-17 RX ADMIN — OXYCODONE HYDROCHLORIDE 10 MILLIGRAM(S): 5 TABLET ORAL at 06:15

## 2019-06-17 RX ADMIN — TRAMADOL HYDROCHLORIDE 50 MILLIGRAM(S): 50 TABLET ORAL at 18:28

## 2019-06-17 RX ADMIN — SODIUM CHLORIDE 125 MILLILITER(S): 9 INJECTION, SOLUTION INTRAVENOUS at 07:40

## 2019-06-17 RX ADMIN — MORPHINE SULFATE 2 MILLIGRAM(S): 50 CAPSULE, EXTENDED RELEASE ORAL at 15:00

## 2019-06-17 RX ADMIN — MORPHINE SULFATE 2 MILLIGRAM(S): 50 CAPSULE, EXTENDED RELEASE ORAL at 19:56

## 2019-06-17 RX ADMIN — MORPHINE SULFATE 2 MILLIGRAM(S): 50 CAPSULE, EXTENDED RELEASE ORAL at 20:26

## 2019-06-17 RX ADMIN — Medication 100 MILLIGRAM(S): at 21:47

## 2019-06-17 RX ADMIN — MORPHINE SULFATE 2 MILLIGRAM(S): 50 CAPSULE, EXTENDED RELEASE ORAL at 01:45

## 2019-06-17 RX ADMIN — OXYCODONE HYDROCHLORIDE 10 MILLIGRAM(S): 5 TABLET ORAL at 22:16

## 2019-06-17 RX ADMIN — SODIUM CHLORIDE 125 MILLILITER(S): 9 INJECTION, SOLUTION INTRAVENOUS at 16:54

## 2019-06-17 RX ADMIN — TRAMADOL HYDROCHLORIDE 50 MILLIGRAM(S): 50 TABLET ORAL at 19:00

## 2019-06-17 RX ADMIN — Medication 325 MILLIGRAM(S): at 05:45

## 2019-06-17 RX ADMIN — OXYCODONE HYDROCHLORIDE 10 MILLIGRAM(S): 5 TABLET ORAL at 05:43

## 2019-06-17 RX ADMIN — TRAMADOL HYDROCHLORIDE 50 MILLIGRAM(S): 50 TABLET ORAL at 09:21

## 2019-06-17 RX ADMIN — OXYCODONE HYDROCHLORIDE 10 MILLIGRAM(S): 5 TABLET ORAL at 00:00

## 2019-06-17 NOTE — DISCHARGE NOTE PROVIDER - NSDCACTIVITY_GEN_ALL_CORE
Do not drive or operate machinery/Walking - Outdoors allowed/Do not make important decisions/Stairs allowed/Walking - Indoors allowed/No heavy lifting/straining

## 2019-06-17 NOTE — PROGRESS NOTE ADULT - SUBJECTIVE AND OBJECTIVE BOX
Orthopedic Surgery Progress Note     S: Patient seen and examined today. No acute events overnight. Pain is well controlled. Denies f/c    MEDICATIONS  (STANDING):  acetaminophen   Tablet .. 975 milliGRAM(s) Oral every 6 hours  aspirin enteric coated 325 milliGRAM(s) Oral two times a day  ceFAZolin   IVPB 2000 milliGRAM(s) IV Intermittent once  docusate sodium 100 milliGRAM(s) Oral three times a day  lactated ringers. 1000 milliLiter(s) (125 mL/Hr) IV Continuous <Continuous>  pantoprazole    Tablet 40 milliGRAM(s) Oral before breakfast  senna 2 Tablet(s) Oral at bedtime  traMADol 50 milliGRAM(s) Oral every 6 hours    MEDICATIONS  (PRN):  bisacodyl Suppository 10 milliGRAM(s) Rectal daily PRN If no bowel movement by POD#2  magnesium hydroxide Suspension 30 milliLiter(s) Oral daily PRN Constipation  morphine  - Injectable 2 milliGRAM(s) IV Push every 4 hours PRN Breakthrough  ondansetron Injectable 4 milliGRAM(s) IV Push every 6 hours PRN Nausea and/or Vomiting  oxyCODONE    IR 10 milliGRAM(s) Oral every 4 hours PRN Severe Pain (7 - 10)  oxyCODONE    IR 5 milliGRAM(s) Oral every 4 hours PRN Moderate Pain (4 - 6)      Physical Exam:    Vital Signs Last 24 Hrs  T(C): 37 (17 Jun 2019 06:02), Max: 37.1 (16 Jun 2019 10:21)  T(F): 98.6 (17 Jun 2019 06:02), Max: 98.7 (16 Jun 2019 10:21)  HR: 58 (17 Jun 2019 06:02) (58 - 71)  BP: 98/57 (17 Jun 2019 06:02) (98/57 - 114/76)  BP(mean): --  RR: 18 (17 Jun 2019 06:02) (18 - 18)  SpO2: 98% (17 Jun 2019 06:02) (93% - 98%)    06-15-19 @ 07:01  -  06-16-19 @ 07:00  --------------------------------------------------------  IN: 1660 mL / OUT: 1525 mL / NET: 135 mL    06-16-19 @ 07:01  -  06-17-19 @ 06:27  --------------------------------------------------------  IN: 4215 mL / OUT: 3500 mL / NET: 715 mL        Alert and Oriented, No Acute Distress  Pulm: no increase work of breathing  Lower Extremities:  RLE: Post Splint/ACE Bandage in place, Toes warm and mobile with brisk cap refill  SILT  [facility]            LABS:                        11.2   9.6   )-----------( 268      ( 16 Jun 2019 12:12 )             33.5     06-16    141  |  100  |  14  ----------------------------<  117<H>  4.4   |  28  |  0.62    Ca    8.9      16 Jun 2019 07:18

## 2019-06-17 NOTE — DISCHARGE NOTE PROVIDER - CARE PROVIDER_API CALL
French Hancock (MD)  Orthopaedic Surgery  [address] [place] [address], [address]  [place] [address]  Phone: [phone]  Fax: [phone]  Follow Up Time:

## 2019-06-17 NOTE — DISCHARGE NOTE PROVIDER - NSDCCPCAREPLAN_GEN_ALL_CORE_FT
PRINCIPAL DISCHARGE DIAGNOSIS  Diagnosis: Trimalleolar fracture of right ankle  Assessment and Plan of Treatment:

## 2019-06-17 NOTE — CHART NOTE - NSCHARTNOTEFT_GEN_A_CORE
Due to the nature of this patient's injuries s/p R ankle Fracture and operative fixation,  Patient will require a Transfer chair.  This is to assist with performing activities of daily living not able to be performed with other assistive equipment.  Other assistive devices have been attempted.  Having the wheelchair will allow patient to perform these activities of daily living.  Patient will have family assist with equipment, and both are in agreement.    LOPEZ Sierra PA-C  #[id number]

## 2019-06-17 NOTE — DISCHARGE NOTE PROVIDER - NSDCCPTREATMENT_GEN_ALL_CORE_FT
PRINCIPAL PROCEDURE  Procedure: Open reduction of trimalleolar fracture of right ankle  Findings and Treatment:

## 2019-06-17 NOTE — DISCHARGE NOTE PROVIDER - NSDCFUADDINST_GEN_ALL_CORE_FT
Schedule followup appointment with Dr. Hancock two weeks after surgery for suture/staple removal if applicable. Continue taking aspirin 325 mg twice daily for 6 weeks following surgery for blood clot prevention. PT/OT: non weight bearing right lower extremity in posterior splint. Recommend scheduling appointment with primary medical doctor within one month of surgery for routine care. Schedule followup appointment with Dr. Hancock two weeks after surgery for suture/staple removal if applicable. Continue taking aspirin 325 mg twice daily for 6 weeks following surgery for blood clot prevention.   PT/OT: non weight bearing right lower extremity in posterior splint. Recommend scheduling appointment with primary medical doctor within one month of discharge to discuss surgery/general checkup/possible medication adjustment.

## 2019-06-17 NOTE — DISCHARGE NOTE PROVIDER - HOSPITAL COURSE
58 year old female presented to [facility] on 6/15 with a R trimalleolar fracture. Patient was cleared for surgery and underwent an open reduction and internal fixation on 6/15 with Dr. Hancock without complication.  PT/OT evaluated patient and recommended discharge home with outpatient physical therapy. Rest of hospital stay unremarkable. 58 year old female presented to [facility] on 6/15 with a Right trimalleolar fracture. Patient was cleared for surgery and underwent an open reduction and internal fixation on 6/15 with Dr. Hancock without complications.  Physical Therapy evaluated and treated the patient and recommended discharge home with outpatient physical therapy. Rest of hospital stay unremarkable.

## 2019-06-17 NOTE — PROGRESS NOTE ADULT - ASSESSMENT
A/P: 59 y/o F POD# 2 S/P R Trimal ORIF    DVT ppx- ASA 325mg PO BID  RLE NWB  PT  GI ppx  Pain management   Dispo planning - home

## 2019-06-18 ENCOUNTER — TRANSCRIPTION ENCOUNTER (OUTPATIENT)
Age: 59
End: 2019-06-18

## 2019-06-18 VITALS
RESPIRATION RATE: 18 BRPM | TEMPERATURE: 98 F | SYSTOLIC BLOOD PRESSURE: 133 MMHG | HEART RATE: 80 BPM | DIASTOLIC BLOOD PRESSURE: 77 MMHG | OXYGEN SATURATION: 96 %

## 2019-06-18 PROCEDURE — 82330 ASSAY OF CALCIUM: CPT

## 2019-06-18 PROCEDURE — 82962 GLUCOSE BLOOD TEST: CPT

## 2019-06-18 PROCEDURE — 84132 ASSAY OF SERUM POTASSIUM: CPT

## 2019-06-18 PROCEDURE — 82803 BLOOD GASES ANY COMBINATION: CPT

## 2019-06-18 PROCEDURE — 82947 ASSAY GLUCOSE BLOOD QUANT: CPT

## 2019-06-18 PROCEDURE — 76000 FLUOROSCOPY <1 HR PHYS/QHP: CPT

## 2019-06-18 PROCEDURE — 85014 HEMATOCRIT: CPT

## 2019-06-18 PROCEDURE — 83605 ASSAY OF LACTIC ACID: CPT

## 2019-06-18 PROCEDURE — 82435 ASSAY OF BLOOD CHLORIDE: CPT

## 2019-06-18 PROCEDURE — 84295 ASSAY OF SERUM SODIUM: CPT

## 2019-06-18 PROCEDURE — 85027 COMPLETE CBC AUTOMATED: CPT

## 2019-06-18 PROCEDURE — 73610 X-RAY EXAM OF ANKLE: CPT

## 2019-06-18 PROCEDURE — 97161 PT EVAL LOW COMPLEX 20 MIN: CPT

## 2019-06-18 PROCEDURE — C1713: CPT

## 2019-06-18 PROCEDURE — 97116 GAIT TRAINING THERAPY: CPT

## 2019-06-18 PROCEDURE — 97530 THERAPEUTIC ACTIVITIES: CPT

## 2019-06-18 PROCEDURE — 80048 BASIC METABOLIC PNL TOTAL CA: CPT

## 2019-06-18 RX ORDER — ASPIRIN/CALCIUM CARB/MAGNESIUM 324 MG
1 TABLET ORAL
Qty: 84 | Refills: 0
Start: 2019-06-18 | End: 2019-07-29

## 2019-06-18 RX ORDER — DOCUSATE SODIUM 100 MG
1 CAPSULE ORAL
Qty: 0 | Refills: 0 | DISCHARGE
Start: 2019-06-18

## 2019-06-18 RX ORDER — OXYCODONE HYDROCHLORIDE 5 MG/1
1 TABLET ORAL
Qty: 42 | Refills: 0
Start: 2019-06-18 | End: 2019-06-24

## 2019-06-18 RX ORDER — ACETAMINOPHEN 500 MG
3 TABLET ORAL
Qty: 0 | Refills: 0 | DISCHARGE
Start: 2019-06-18

## 2019-06-18 RX ORDER — SENNA PLUS 8.6 MG/1
2 TABLET ORAL
Qty: 0 | Refills: 0 | DISCHARGE
Start: 2019-06-18

## 2019-06-18 RX ORDER — PANTOPRAZOLE SODIUM 20 MG/1
1 TABLET, DELAYED RELEASE ORAL
Qty: 0 | Refills: 0 | DISCHARGE
Start: 2019-06-18

## 2019-06-18 RX ORDER — TRAMADOL HYDROCHLORIDE 50 MG/1
1 TABLET ORAL
Qty: 40 | Refills: 0
Start: 2019-06-18 | End: 2019-06-24

## 2019-06-18 RX ADMIN — Medication 975 MILLIGRAM(S): at 07:56

## 2019-06-18 RX ADMIN — Medication 975 MILLIGRAM(S): at 08:26

## 2019-06-18 RX ADMIN — TRAMADOL HYDROCHLORIDE 50 MILLIGRAM(S): 50 TABLET ORAL at 00:30

## 2019-06-18 RX ADMIN — OXYCODONE HYDROCHLORIDE 10 MILLIGRAM(S): 5 TABLET ORAL at 11:12

## 2019-06-18 RX ADMIN — Medication 325 MILLIGRAM(S): at 05:15

## 2019-06-18 RX ADMIN — Medication 100 MILLIGRAM(S): at 05:15

## 2019-06-18 RX ADMIN — TRAMADOL HYDROCHLORIDE 50 MILLIGRAM(S): 50 TABLET ORAL at 06:10

## 2019-06-18 RX ADMIN — Medication 100 MILLIGRAM(S): at 13:54

## 2019-06-18 RX ADMIN — OXYCODONE HYDROCHLORIDE 10 MILLIGRAM(S): 5 TABLET ORAL at 10:42

## 2019-06-18 RX ADMIN — TRAMADOL HYDROCHLORIDE 50 MILLIGRAM(S): 50 TABLET ORAL at 00:04

## 2019-06-18 RX ADMIN — TRAMADOL HYDROCHLORIDE 50 MILLIGRAM(S): 50 TABLET ORAL at 06:40

## 2019-06-18 RX ADMIN — OXYCODONE HYDROCHLORIDE 10 MILLIGRAM(S): 5 TABLET ORAL at 05:45

## 2019-06-18 RX ADMIN — PANTOPRAZOLE SODIUM 40 MILLIGRAM(S): 20 TABLET, DELAYED RELEASE ORAL at 05:15

## 2019-06-18 RX ADMIN — OXYCODONE HYDROCHLORIDE 10 MILLIGRAM(S): 5 TABLET ORAL at 05:14

## 2019-06-18 RX ADMIN — Medication 975 MILLIGRAM(S): at 13:54

## 2019-06-18 NOTE — PROGRESS NOTE ADULT - ASSESSMENT
A/P: 57 y/o F POD#3  S/P R Trimal ORIF    DVT ppx- ASA 325mg PO BID  RLE NWB  PT  GI ppx  Pain management   Dispo planning - home

## 2019-06-18 NOTE — PROGRESS NOTE ADULT - SUBJECTIVE AND OBJECTIVE BOX
Orthopedic Surgery Progress Note     S: Patient seen and examined today. No acute events overnight. Pain is well controlled. Denies f/c    Physical Exam:    Vital Signs Last 24 Hrs  T(C): 36.8 (18 Jun 2019 04:54), Max: 36.9 (17 Jun 2019 14:44)  T(F): 98.3 (18 Jun 2019 04:54), Max: 98.4 (17 Jun 2019 14:44)  HR: 71 (18 Jun 2019 04:54) (60 - 71)  BP: 123/79 (18 Jun 2019 04:54) (106/70 - 148/83)  BP(mean): --  RR: 18 (18 Jun 2019 04:54) (18 - 18)  SpO2: 94% (18 Jun 2019 04:54) (94% - 96%)    Alert and Oriented, No Acute Distress  Pulm: no increase work of breathing  Lower Extremities:  RLE: Post Splint/ACE Bandage in place, Toes warm and mobile with brisk cap refill  SILT  WWP

## 2019-06-18 NOTE — DISCHARGE NOTE NURSING/CASE MANAGEMENT/SOCIAL WORK - NSDCDPATPORTLINK_GEN_ALL_CORE
You can access the [facility], offered by [facility], by registering with the following website: [email or web address]

## 2019-08-13 NOTE — PHYSICAL THERAPY INITIAL EVALUATION ADULT - PATIENT PROFILE REVIEW, REHAB EVAL
"[facility] (writer) spoke with pt's aunt, Johan Faustin [phone], to discuss pt admit and discharge options. [facility] discusses pt's appropriateness for hospitalization and his guardianship situation. [facility] explained how pt's aunt could get more information on becoming pt's guardian and that pt is currently his own legal guardian so could sign himself out. [facility] also provided rational for why trx team would not be able to keep the pt hospitalized based on his current presentation. [facility] offered to get pt referred to CM services and assist with that due to pt's aunt not having luck doing it herself previously. [facility] and aunt spoke at length and it was explained why pt was being discharge at the end of the day.      Pt reports feeling ready to discharge and only wants help getting set up with a therapist. Pt reported talking with doctore and determining that he was to discharge today. [facility] explained that he will need to work with his current clinic to find a therapist as he is on a \"same day\" appointment schedule due to missing to many appointments. Additionally, due to his limited transportation and insurance options, [facility] will not have time to arrange alternative options prior to discharge. [facility] passed this information onto pt's aunt as well. [facility] also discussed CM with pt and explained how that process should work and that pt will need to talk to them via phone, encouraging pt to have his aunt talk to them as well as pt demonstrated limited insight at times. Pt was fine with this and reported being happy to discharge today.   " yes

## 2022-09-13 NOTE — DISCHARGE NOTE NURSING/CASE MANAGEMENT/SOCIAL WORK - NSDPACMPNY_GEN_ALL_CORE
Family Implemented All Universal Safety Interventions:  [place] to call system. Call bell, personal items and telephone within reach. Instruct patient to call for assistance. Room bathroom lighting operational. Non-slip footwear when patient is off stretcher. Physically safe environment: no spills, clutter or unnecessary equipment. Stretcher in lowest position, wheels locked, appropriate side rails in place.

## 2022-12-29 ENCOUNTER — APPOINTMENT (OUTPATIENT)
Dept: PULMONOLOGY | Facility: CLINIC | Age: 62
End: 2022-12-29
Payer: COMMERCIAL

## 2022-12-29 ENCOUNTER — LABORATORY RESULT (OUTPATIENT)
Age: 62
End: 2022-12-29

## 2022-12-29 VITALS — HEART RATE: 95 BPM | SYSTOLIC BLOOD PRESSURE: 121 MMHG | DIASTOLIC BLOOD PRESSURE: 78 MMHG | OXYGEN SATURATION: 95 %

## 2022-12-29 DIAGNOSIS — R05.9 COUGH, UNSPECIFIED: ICD-10-CM

## 2022-12-29 PROCEDURE — 71046 X-RAY EXAM CHEST 2 VIEWS: CPT

## 2022-12-29 PROCEDURE — 94060 EVALUATION OF WHEEZING: CPT

## 2022-12-29 PROCEDURE — 95012 NITRIC OXIDE EXP GAS DETER: CPT

## 2022-12-29 PROCEDURE — 99203 OFFICE O/P NEW LOW 30 MIN: CPT | Mod: 25

## 2022-12-30 LAB
BASOPHILS # BLD AUTO: 0.07 K/UL
BASOPHILS NFR BLD AUTO: 0.9 %
EOSINOPHIL # BLD AUTO: 0.1 K/UL
EOSINOPHIL NFR BLD AUTO: 1.3 %
HCT VFR BLD CALC: 44 %
HGB BLD-MCNC: 14.5 G/DL
IMM GRANULOCYTES NFR BLD AUTO: 0.4 %
LYMPHOCYTES # BLD AUTO: 1 K/UL
LYMPHOCYTES NFR BLD AUTO: 13.1 %
MAN DIFF?: NORMAL
MCHC RBC-ENTMCNC: 29.2 PG
MCHC RBC-ENTMCNC: 33 GM/DL
MCV RBC AUTO: 88.5 FL
MONOCYTES # BLD AUTO: 0.62 K/UL
MONOCYTES NFR BLD AUTO: 8.1 %
NEUTROPHILS # BLD AUTO: 5.83 K/UL
NEUTROPHILS NFR BLD AUTO: 76.2 %
PLATELET # BLD AUTO: 310 K/UL
RBC # BLD: 4.97 M/UL
RBC # FLD: 12 %
WBC # FLD AUTO: 7.65 K/UL

## 2023-01-02 ENCOUNTER — NON-APPOINTMENT (OUTPATIENT)
Age: 63
End: 2023-01-02

## 2023-01-03 ENCOUNTER — NON-APPOINTMENT (OUTPATIENT)
Age: 63
End: 2023-01-03

## 2023-01-03 LAB
A ALTERNATA IGE QN: <0.1 KUA/L
A ALTERNATA IGE QN: <0.1 KUA/L
A FLAVUS AB FLD QL: NEGATIVE
A FUMIGATUS AB FLD QL: NEGATIVE
A FUMIGATUS IGE QN: <0.1 KUA/L
A FUMIGATUS IGE QN: <0.1 KUA/L
A NIGER AB FLD QL: NEGATIVE
BERMUDA GRASS IGE QN: <0.1 KUA/L
BOXELDER IGE QN: <0.1 KUA/L
C ALBICANS IGE QN: <0.1 KUA/L
C HERBARUM IGE QN: <0.1 KUA/L
C HERBARUM IGE QN: <0.1 KUA/L
CALIF WALNUT IGE QN: <0.1 KUA/L
CAT DANDER IGE QN: <0.1 KUA/L
CAT DANDER IGE QN: <0.1 KUA/L
CLAM IGE QN: <0.1 KUA/L
CMN PIGWEED IGE QN: <0.1 KUA/L
CODFISH IGE QN: <0.1 KUA/L
COMMON RAGWEED IGE QN: <0.1 KUA/L
COMMON RAGWEED IGE QN: <0.1 KUA/L
CORN IGE QN: <0.1 KUA/L
COTTONWOOD IGE QN: <0.1 KUA/L
COW MILK IGE QN: <0.1 KUA/L
D FARINAE IGE QN: <0.1 KUA/L
D FARINAE IGE QN: <0.1 KUA/L
D PTERONYSS IGE QN: <0.1 KUA/L
D PTERONYSS IGE QN: <0.1 KUA/L
DEPRECATED A ALTERNATA IGE RAST QL: 0
DEPRECATED A ALTERNATA IGE RAST QL: 0
DEPRECATED A FUMIGATUS IGE RAST QL: 0
DEPRECATED A FUMIGATUS IGE RAST QL: 0
DEPRECATED BERMUDA GRASS IGE RAST QL: 0
DEPRECATED BOXELDER IGE RAST QL: 0
DEPRECATED C ALBICANS IGE RAST QL: 0
DEPRECATED C HERBARUM IGE RAST QL: 0
DEPRECATED C HERBARUM IGE RAST QL: 0
DEPRECATED CAT DANDER IGE RAST QL: 0
DEPRECATED CAT DANDER IGE RAST QL: 0
DEPRECATED CLAM IGE RAST QL: 0
DEPRECATED CODFISH IGE RAST QL: 0
DEPRECATED COMMON PIGWEED IGE RAST QL: 0
DEPRECATED COMMON RAGWEED IGE RAST QL: 0
DEPRECATED COMMON RAGWEED IGE RAST QL: 0
DEPRECATED CORN IGE RAST QL: 0
DEPRECATED COTTONWOOD IGE RAST QL: 0
DEPRECATED COW MILK IGE RAST QL: 0
DEPRECATED D FARINAE IGE RAST QL: 0
DEPRECATED D FARINAE IGE RAST QL: 0
DEPRECATED D PTERONYSS IGE RAST QL: 0
DEPRECATED D PTERONYSS IGE RAST QL: 0
DEPRECATED DOG DANDER IGE RAST QL: 0
DEPRECATED DOG DANDER IGE RAST QL: 0
DEPRECATED EGG WHITE IGE RAST QL: 0
DEPRECATED GOOSEFOOT IGE RAST QL: 0
DEPRECATED LONDON PLANE IGE RAST QL: 0
DEPRECATED M RACEMOSUS IGE RAST QL: 0
DEPRECATED MOUSE URINE PROT IGE RAST QL: 0
DEPRECATED MUGWORT IGE RAST QL: 0
DEPRECATED P NOTATUM IGE RAST QL: 0
DEPRECATED PEANUT IGE RAST QL: 0
DEPRECATED RED CEDAR IGE RAST QL: 0
DEPRECATED ROACH IGE RAST QL: 0
DEPRECATED ROACH IGE RAST QL: 0
DEPRECATED SCALLOP IGE RAST QL: <0.1 KUA/L
DEPRECATED SESAME SEED IGE RAST QL: 0
DEPRECATED SHEEP SORREL IGE RAST QL: 0
DEPRECATED SHRIMP IGE RAST QL: 0
DEPRECATED SILVER BIRCH IGE RAST QL: 0
DEPRECATED SOYBEAN IGE RAST QL: 0
DEPRECATED TIMOTHY IGE RAST QL: 0
DEPRECATED TIMOTHY IGE RAST QL: 0
DEPRECATED WALNUT IGE RAST QL: 0
DEPRECATED WHEAT IGE RAST QL: 0
DEPRECATED WHITE ASH IGE RAST QL: 0
DEPRECATED WHITE OAK IGE RAST QL: 0
DEPRECATED WHITE OAK IGE RAST QL: 0
DOG DANDER IGE QN: <0.1 KUA/L
DOG DANDER IGE QN: <0.1 KUA/L
EGG WHITE IGE QN: <0.1 KUA/L
GOOSEFOOT IGE QN: <0.1 KUA/L
LONDON PLANE IGE QN: <0.1 KUA/L
M RACEMOSUS IGE QN: <0.1 KUA/L
MOUSE URINE PROT IGE QN: <0.1 KUA/L
MUGWORT IGE QN: <0.1 KUA/L
MULBERRY (T70) CLASS: 0
MULBERRY (T70) CONC: <0.1 KUA/L
P NOTATUM IGE QN: <0.1 KUA/L
PEANUT IGE QN: <0.1 KUA/L
RED CEDAR IGE QN: <0.1 KUA/L
ROACH IGE QN: <0.1 KUA/L
ROACH IGE QN: <0.1 KUA/L
SCALLOP IGE QN: 0
SCALLOP IGE QN: <0.1 KUA/L
SESAME SEED IGE QN: <0.1 KUA/L
SHEEP SORREL IGE QN: <0.1 KUA/L
SILVER BIRCH IGE QN: <0.1 KUA/L
SOYBEAN IGE QN: <0.1 KUA/L
TIMOTHY IGE QN: <0.1 KUA/L
TIMOTHY IGE QN: <0.1 KUA/L
TREE ALLERG MIX1 IGE QL: 0
WALNUT IGE QN: <0.1 KUA/L
WHEAT IGE QN: <0.1 KUA/L
WHITE ASH IGE QN: <0.1 KUA/L
WHITE ELM IGE QN: 0
WHITE ELM IGE QN: <0.1 KUA/L
WHITE OAK IGE QN: <0.1 KUA/L
WHITE OAK IGE QN: <0.1 KUA/L

## 2023-01-03 RX ORDER — OMEPRAZOLE 40 MG/1
40 CAPSULE, DELAYED RELEASE ORAL
Refills: 0 | Status: ACTIVE | COMMUNITY

## 2023-01-03 RX ORDER — FEXOFENADINE HCL 180 MG
180 TABLET ORAL
Refills: 0 | Status: ACTIVE | COMMUNITY

## 2023-01-03 RX ORDER — DICYCLOMINE HYDROCHLORIDE 10 MG/1
10 CAPSULE ORAL
Refills: 0 | Status: ACTIVE | COMMUNITY

## 2023-01-03 RX ORDER — ALBUTEROL SULFATE 90 UG/1
108 (90 BASE) INHALANT RESPIRATORY (INHALATION)
Refills: 0 | Status: ACTIVE | COMMUNITY

## 2023-01-03 NOTE — ADDENDUM
[FreeTextEntry1] : I, Jess Fitzpatrickjosé miguel, acted solely as a scribe for Dr. Kai Jack M.D. on this date 12/29/2022. \par \par All medical record entries made by the Scribe were at my, Dr. Kai Jack M.D., direction and personally dictated by me on 12/29/2022. I have reviewed the chart and agree that the record accurately reflects my personal performance of the history, physical exam, assessment and plan. I have also personally directed, reviewed, and agreed with the chart.

## 2023-01-03 NOTE — REVIEW OF SYSTEMS
[Nasal Congestion] : nasal congestion [Dry Mouth] : dry mouth [Negative] : Endocrine [TextBox_14] : cephalalgia

## 2023-01-03 NOTE — ASSESSMENT
[FreeTextEntry1] : Cough.  Chest x-ray negative.  Obstructive pattern on PFT.  Former smoker.  Difficult to determine if ongoing symptoms related to lower airways or only upper airway issue recommend inhaler trial\par \par Start Trelegy 100 mcg 1 inhalation daily\par \par Follow-up 1 month\par \par Obtain allergy profile

## 2023-01-03 NOTE — HISTORY OF PRESENT ILLNESS
[Former] : former [TextBox_4] : 62-year-old woman referred for evaluation of cough.  Followed by ENT for cough symptoms related to throat nasal drip and reflux.  However a pulmonary component is being sore as well she notes a sensation of mucus in her chest pointing to a specific spot on her left chest where she feels there is congestion.  She has to cough hard to expectorate this.  She has previously seen pulmonary for wheezing.  She is a former smoker.  She was told to use inhalers only on a as needed basis.  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  Reports having a wound on her leg that did not heal because of metal allergy.\par \par \par \par  [TextBox_11] : <1 [TextBox_13] : 40 [YearQuit] : 2016

## 2023-02-09 ENCOUNTER — APPOINTMENT (OUTPATIENT)
Dept: PULMONOLOGY | Facility: CLINIC | Age: 63
End: 2023-02-09

## 2023-03-21 ENCOUNTER — APPOINTMENT (OUTPATIENT)
Dept: PULMONOLOGY | Facility: CLINIC | Age: 63
End: 2023-03-21
Payer: COMMERCIAL

## 2023-03-21 VITALS
WEIGHT: 180 LBS | BODY MASS INDEX: 30.73 KG/M2 | DIASTOLIC BLOOD PRESSURE: 77 MMHG | HEIGHT: 64 IN | OXYGEN SATURATION: 94 % | SYSTOLIC BLOOD PRESSURE: 111 MMHG | HEART RATE: 87 BPM

## 2023-03-21 PROCEDURE — 94729 DIFFUSING CAPACITY: CPT

## 2023-03-21 PROCEDURE — 99213 OFFICE O/P EST LOW 20 MIN: CPT | Mod: 25

## 2023-03-21 PROCEDURE — 71046 X-RAY EXAM CHEST 2 VIEWS: CPT

## 2023-03-21 PROCEDURE — ZZZZZ: CPT

## 2023-03-21 PROCEDURE — 94727 GAS DIL/WSHOT DETER LNG VOL: CPT

## 2023-03-21 PROCEDURE — 94010 BREATHING CAPACITY TEST: CPT

## 2023-03-22 PROCEDURE — 95800 SLP STDY UNATTENDED: CPT

## 2023-03-23 NOTE — ASSESSMENT
[FreeTextEntry1] : Clinically sounds like patient has sleep apnea.  Recommend home sleep study for risk stratification prior to clearance for endoscopy.

## 2023-03-23 NOTE — HISTORY OF PRESENT ILLNESS
[Never] : never [Obstructive Sleep Apnea] : obstructive sleep apnea [Awakes Unrefreshed] : awakes unrefreshed [Daytime Somnolence] : daytime somnolence [TextBox_4] : Patient here for clearance for EGD/Colonoscopy\par Had issue with loss of airway during prior colonoscopy\par some snoring\par multiple awakenings.\par child has BRITTANY\par Attends to disabled child at night\par Does report snoring and nonrestorative sleep\par

## 2023-03-23 NOTE — ADDENDUM
[FreeTextEntry1] : Addendum: March 23, 2023\par Home sleep study demonstrate moderate BRITTANY without significant oxygen desaturation.\par \par Findings communicated to gastroenterologist Dr. Hitchcock-anesthesiologist will be advised of patient's BRITTANY and patient will be given appropriate opportunity to recover postoperatively.

## 2023-05-09 ENCOUNTER — APPOINTMENT (OUTPATIENT)
Dept: PULMONOLOGY | Facility: CLINIC | Age: 63
End: 2023-05-09
Payer: COMMERCIAL

## 2023-05-09 VITALS — OXYGEN SATURATION: 95 % | SYSTOLIC BLOOD PRESSURE: 130 MMHG | DIASTOLIC BLOOD PRESSURE: 80 MMHG | HEART RATE: 92 BPM

## 2023-05-09 DIAGNOSIS — G47.33 OBSTRUCTIVE SLEEP APNEA (ADULT) (PEDIATRIC): ICD-10-CM

## 2023-05-09 DIAGNOSIS — Z82.5 FAMILY HISTORY OF ASTHMA AND OTHER CHRONIC LOWER RESPIRATORY DISEASES: ICD-10-CM

## 2023-05-09 DIAGNOSIS — Z87.891 PERSONAL HISTORY OF NICOTINE DEPENDENCE: ICD-10-CM

## 2023-05-09 PROCEDURE — 99213 OFFICE O/P EST LOW 20 MIN: CPT

## 2023-05-09 NOTE — DISCUSSION/SUMMARY
[FreeTextEntry1] : HSS results discussed with patient, AHI of 18, moderate BRITTANY \par \par The pathophysiology as well as medical implications of untreated obstructive sleep apnea syndrome were discussed with this patient. Treatment options including CPAP therapy, mandibular advancement device and weight loss were also discussed; patient states she would like to do her own research and and research if insurance covers the CPAP or oral appliance

## 2023-05-09 NOTE — HISTORY OF PRESENT ILLNESS
[TextBox_4] : F/u \par \par Followup for sleep apnea\par Patient here to review results of home sleep study.\par No change in history as reported on initial evaluation\par Patient states that she feels the results were not accurate because she did not sleep well the device was moving\par \par Had HSS done due to future ankle surgery and need for general anesthesia\par

## 2023-05-09 NOTE — ASSESSMENT
[FreeTextEntry1] : Moderate BRITTANY on home sleep testing although patient does not express confidence in the result.  Patient may proceed with orthopedic surgery as scheduled and maintain perioperative BRITTANY precautions.  In terms of treatment I would recommend repeating the study prior to offering treatment to the patient.  Recommend that the study be performed to complete nights unlike the 1 night on the current test\par Patient will get back to us\par \par \par

## 2023-11-20 NOTE — PHYSICAL THERAPY INITIAL EVALUATION ADULT - ADDITIONAL COMMENTS
Yarelis presents due to some left ear popping still. She was seen in urgent care on 11/8/2023 due to left ear fullness, cough and sinus congestion. She was diagnosed with left OM and treated with Cefdinir. She notes that this caused upset stomach but completed it. The ear is less full but continues to have some fluid. She has an occasional cough still.     Problem list reviewed today.    Medications reviewed today.     Allergies as of 11/20/2023 - Reviewed 11/20/2023   Allergen Reaction Noted   • Codeine NAUSEA 03/06/2001   • Doxycycline NAUSEA and Other (See Comments) 03/20/2023       PHYSICAL EXAMINATION:   Visit Vitals  /68 (BP Location: RUE - Right upper extremity, Patient Position: Sitting, Cuff Size: Regular)   Pulse (!) 58   Temp 98.2 °F (36.8 °C) (Oral)   Wt 57.1 kg (125 lb 12.8 oz)   SpO2 98%   BMI 20.93 kg/m²   Well-developed, well-nourished woman in no acute distress.   Skin: Warm, dry. No rash.   Normocephalic, atraumatic. pupils equal, round, reactive to light and accommodation. extraocular motion intact.   Scleral conjunctiva non-injected.   External ears normal in appearance.   Auditory canals clear. Tympanic membranes non-injected.  Nose with clear nares. Normal septum and turbinates.  Oropharynx moist, no lesions.  Tongue midline.  Neck supple. No cervical or supraclavicular adenopathy. No thyromegaly.   Lungs: Clear, no wheezing, rales or rhonchi. Respiration not labored.  Heart regularly regular. No murmur, no gallop or rubs.      Impression:  Resolved left OM.   Continued eustachian tube dysfunction.     Plan:  Stop Astelin as she feels it is making her worse.   Use Saline irrigation. May use Mucinex and Sudafed as needed.   May use Flonase nasal spray.   COVID vaccine given.     Rloando Landry MD    Pt lives in a  with her spouse and child who is handicapped. Has ramp access to enter and chair lift inside. Was ambulating independently without use of an AD and was independent with all ADLS PTA. Has assist from her son's HHA. Owns elevated toilet seat, grabbars, tub transfer bench and crutches

## 2023-11-27 ENCOUNTER — APPOINTMENT (OUTPATIENT)
Dept: PEDIATRIC ALLERGY IMMUNOLOGY | Facility: CLINIC | Age: 63
End: 2023-11-27
Payer: COMMERCIAL

## 2023-11-27 VITALS
OXYGEN SATURATION: 95 % | SYSTOLIC BLOOD PRESSURE: 121 MMHG | HEART RATE: 74 BPM | DIASTOLIC BLOOD PRESSURE: 67 MMHG | BODY MASS INDEX: 30.73 KG/M2 | HEIGHT: 64 IN | WEIGHT: 180 LBS

## 2023-11-27 DIAGNOSIS — J30.89 OTHER ALLERGIC RHINITIS: ICD-10-CM

## 2023-11-27 DIAGNOSIS — E73.9 LACTOSE INTOLERANCE, UNSPECIFIED: ICD-10-CM

## 2023-11-27 PROCEDURE — 36415 COLL VENOUS BLD VENIPUNCTURE: CPT

## 2023-11-27 PROCEDURE — 99204 OFFICE O/P NEW MOD 45 MIN: CPT | Mod: 25

## 2023-11-27 RX ORDER — HYDROXYZINE HYDROCHLORIDE 25 MG/1
25 TABLET ORAL
Qty: 30 | Refills: 3 | Status: ACTIVE | COMMUNITY
Start: 2023-11-27 | End: 1900-01-01

## 2023-11-28 LAB
ALBUMIN SERPL ELPH-MCNC: 4.7 G/DL
ALP BLD-CCNC: 75 U/L
ALT SERPL-CCNC: 21 U/L
ANION GAP SERPL CALC-SCNC: 12 MMOL/L
AST SERPL-CCNC: 22 U/L
BASOPHILS # BLD AUTO: 0.05 K/UL
BASOPHILS NFR BLD AUTO: 0.8 %
BILIRUB SERPL-MCNC: 0.3 MG/DL
BUN SERPL-MCNC: 17 MG/DL
CALCIUM SERPL-MCNC: 9.7 MG/DL
CHLORIDE SERPL-SCNC: 105 MMOL/L
CO2 SERPL-SCNC: 26 MMOL/L
CREAT SERPL-MCNC: 0.8 MG/DL
EGFR: 83 ML/MIN/1.73M2
EOSINOPHIL # BLD AUTO: 0.08 K/UL
EOSINOPHIL NFR BLD AUTO: 1.2 %
GLUCOSE SERPL-MCNC: 111 MG/DL
HCT VFR BLD CALC: 43 %
HGB BLD-MCNC: 13.8 G/DL
IMM GRANULOCYTES NFR BLD AUTO: 0.3 %
LYMPHOCYTES # BLD AUTO: 1 K/UL
LYMPHOCYTES NFR BLD AUTO: 15.2 %
MAN DIFF?: NORMAL
MCHC RBC-ENTMCNC: 28.8 PG
MCHC RBC-ENTMCNC: 32.1 GM/DL
MCV RBC AUTO: 89.8 FL
MONOCYTES # BLD AUTO: 0.63 K/UL
MONOCYTES NFR BLD AUTO: 9.6 %
NEUTROPHILS # BLD AUTO: 4.81 K/UL
NEUTROPHILS NFR BLD AUTO: 72.9 %
PLATELET # BLD AUTO: 270 K/UL
POTASSIUM SERPL-SCNC: 4.8 MMOL/L
PROT SERPL-MCNC: 7.3 G/DL
RBC # BLD: 4.79 M/UL
RBC # FLD: 12.1 %
SODIUM SERPL-SCNC: 142 MMOL/L
WBC # FLD AUTO: 6.59 K/UL

## 2023-11-29 ENCOUNTER — NON-APPOINTMENT (OUTPATIENT)
Age: 63
End: 2023-11-29

## 2023-11-29 LAB — TRYPTASE: 8.6 UG/L

## 2023-12-01 ENCOUNTER — NON-APPOINTMENT (OUTPATIENT)
Age: 63
End: 2023-12-01

## 2023-12-01 LAB — CHRONIC URTICARIA PANEL (CU INDEX): 5.1

## 2023-12-07 LAB
IGE AB SERPL QL: 26 NG/ML
IGE RECEPTOR AB INTERPRETATION: NORMAL
IGE RECEPTOR AB: 2.3 %

## 2023-12-18 RX ORDER — DESLORATADINE 5 MG/1
5 TABLET ORAL
Qty: 30 | Refills: 2 | Status: ACTIVE | COMMUNITY
Start: 2023-12-18 | End: 1900-01-01

## 2023-12-22 RX ORDER — LEVOCETIRIZINE DIHYDROCHLORIDE 5 MG/1
5 TABLET ORAL
Qty: 60 | Refills: 3 | Status: DISCONTINUED | COMMUNITY
Start: 2023-11-27 | End: 2023-12-22

## 2024-01-17 ENCOUNTER — APPOINTMENT (OUTPATIENT)
Dept: PEDIATRIC ALLERGY IMMUNOLOGY | Facility: CLINIC | Age: 64
End: 2024-01-17
Payer: COMMERCIAL

## 2024-01-17 VITALS
WEIGHT: 178.6 LBS | HEART RATE: 67 BPM | DIASTOLIC BLOOD PRESSURE: 87 MMHG | BODY MASS INDEX: 30.66 KG/M2 | SYSTOLIC BLOOD PRESSURE: 121 MMHG

## 2024-01-17 DIAGNOSIS — R74.8 ABNORMAL LEVELS OF OTHER SERUM ENZYMES: ICD-10-CM

## 2024-01-17 DIAGNOSIS — L28.2 OTHER PRURIGO: ICD-10-CM

## 2024-01-17 DIAGNOSIS — J30.81 ALLERGIC RHINITIS DUE TO ANIMAL (CAT) (DOG) HAIR AND DANDER: ICD-10-CM

## 2024-01-17 DIAGNOSIS — T78.3XXA ANGIONEUROTIC EDEMA, INITIAL ENCOUNTER: ICD-10-CM

## 2024-01-17 DIAGNOSIS — L50.8 OTHER URTICARIA: ICD-10-CM
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
## 2024-01-17 PROCEDURE — 99214 OFFICE O/P EST MOD 30 MIN: CPT | Mod: 25

## 2024-01-17 PROCEDURE — 95004 PERQ TESTS W/ALRGNC XTRCS: CPT

## 2024-01-17 RX ORDER — FLUTICASONE PROPIONATE 50 UG/1
50 SPRAY, METERED NASAL
Refills: 0 | Status: ACTIVE | COMMUNITY

## 2024-01-17 RX ORDER — AZELASTINE HYDROCHLORIDE 137 UG/1
SPRAY, METERED NASAL
Refills: 0 | Status: ACTIVE | COMMUNITY

## 2024-01-17 NOTE — REASON FOR VISIT
[Initial Consultation] : an initial consultation for [Allergy Evaluation/ Skin Testing] : allergy evaluation and or skin testing [Routine Follow-Up] : a routine follow-up visit for [Rash] : rash

## 2024-01-17 NOTE — IMPRESSION
[_____] : cat ([unfilled]) [Allergy Testing Dust Mite] : dust mites [Allergy Testing Mixed Feathers] : feathers [] : molds [Allergy Testing Trees] : trees [Allergy Testing Weeds] : weeds [Allergy Testing Grasses] : grasses

## 2024-01-17 NOTE — PHYSICAL EXAM
[Alert] : alert [Well Nourished] : well nourished [No Acute Distress] : no acute distress [Well Developed] : well developed [Sclera Not Icteric] : sclera not icteric [Normal TMs] : both tympanic membranes were normal [Normal Rate and Effort] : normal respiratory rhythm and effort [No Crackles] : no crackles [Bilateral Audible Breath Sounds] : bilateral audible breath sounds [Normal Rate] : heart rate was normal  [Normal S1, S2] : normal S1 and S2 [No murmur] : no murmur [Regular Rhythm] : with a regular rhythm [Skin Intact] : skin intact  [No Rash] : no rash

## 2024-01-19 NOTE — HISTORY OF PRESENT ILLNESS
[de-identified] : 63yoF with PMHx of chronic cough, BRITTANY and metal allergy presenting for initial consultation. Referred by Dr. Jacob Jorgensen of ENT for insect bite reactions and suspected chronic urticaria.   HPI: Patient sent by primary ENT concerned for insect bite reactions. She would get huge painful and itchy welts, and wakes up with lip and face swelling. She attributed these reactions to insect bites but never actually saw a biting insect. Had a "big bubble of fluid" underneath the eye; this last occurred on November 1st. This has been going on about a year, occurs every couple of months. Has been getting multiple rounds of antibiotics and steroids, last prescribed 5d course methylpred on 8/26/2023, has had about 5 rounds of steroids this year, despite taking Fexofenadine 180 mg bid. On one instance she was bitten by an unknown insect in the morning on the arm, which spread circumferentially down the rest of the arm. She thinks the bites are from mosquitoes in the late summer, but recently have been from spiders in the unfinished basement. Per patient, only has history of environmental allergies since she was around 30 years old. No history of recurrent hives or swelling. Denies any history of tick bites.   Had rashes when younger with costumed jewelry, usually okay with silver and gold.   Has seen an allergist previously allergens which revealed positive test for cats, dogs, dust mites.   Medications: Last took allegra AM on Thursday 11/23. On Allegra 180mg BID Takes azelastine BID and flonase daily for nasal symptoms.  Also takes omeprazole 40gm q3 days, Has PRN albuterol, rarely uses.  Also on Vitamin D and multivitamins  SH: lives with disabled son and takes care of granddaughters occasionally  PMHx: L knee replacement, plates in screws in the R ankle; Corrigan's Esophagus

## 2024-01-19 NOTE — REVIEW OF SYSTEMS
[Dry Skin] : ~L dry skin [Wheezing Worsens With Exercise] : wheezing worsens with exercise [Nl] : no history of recurrent infections of the sinuses,ear, throat, lungs (pneumonia/ bronchitis) or skin [Fever] : no fever [Wgt Loss (___ Lbs)] : no recent weight loss [Chest Pain] : no chest pain or discomfort [Wheezing] : no wheezing [Vomiting] : no vomiting [Diarrhea] : no diarrhea [Atopic Dermatitis] : no atopic dermatitis [Recurrent Sinus Infections] : no recurrent sinus infections [Recurrent Ear Infections] : no recurrence or ear infections [Recurrent Pneumonia] : no ~T recurrent pneumonia

## 2024-01-19 NOTE — HISTORY OF PRESENT ILLNESS
[de-identified] : 63yoF with PMHx of chronic cough, BRITTANY and metal allergy presenting for initial consultation. Referred by Dr. Jacob Jorgensen of ENT for insect bite reactions and suspected chronic urticaria.   HPI: Patient sent by primary ENT concerned for insect bite reactions. She would get huge painful and itchy welts, and wakes up with lip and face swelling. She attributed these reactions to insect bites but never actually saw a biting insect. Had a "big bubble of fluid" underneath the eye; this last occurred on November 1st. This has been going on about a year, occurs every couple of months. Has been getting multiple rounds of antibiotics and steroids, last prescribed 5d course methylpred on 8/26/2023, has had about 5 rounds of steroids this year, despite taking Fexofenadine 180 mg bid. On one instance she was bitten by an unknown insect in the morning on the arm, which spread circumferentially down the rest of the arm. She thinks the bites are from mosquitoes in the late summer, but recently have been from spiders in the unfinished basement. Per patient, only has history of environmental allergies since she was around 30 years old. No history of recurrent hives or swelling. Denies any history of tick bites.   Had rashes when younger with costumed jewelry, usually okay with silver and gold.   Has seen an allergist previously allergens which revealed positive test for cats, dogs, dust mites.   Medications: Last took allegra AM on Thursday 11/23. On Allegra 180mg BID Takes azelastine BID and flonase daily for nasal symptoms.  Also takes omeprazole 40gm q3 days, Has PRN albuterol, rarely uses.  Also on Vitamin D and multivitamins  SH: lives with disabled son and takes care of granddaughters occasionally  PMHx: L knee replacement, plates in screws in the R ankle; Corrigan's Esophagus

## 2025-01-03 RX ORDER — MONTELUKAST 10 MG/1
10 TABLET, FILM COATED ORAL
Qty: 1 | Refills: 1 | Status: ACTIVE | COMMUNITY
Start: 2025-01-03 | End: 1900-01-01

## 2025-01-03 RX ORDER — FAMOTIDINE 10 MG/1
10 TABLET, FILM COATED ORAL DAILY
Qty: 1 | Refills: 0 | Status: ACTIVE | COMMUNITY
Start: 2025-01-03 | End: 1900-01-01

## 2025-01-08 ENCOUNTER — APPOINTMENT (OUTPATIENT)
Dept: PEDIATRIC ALLERGY IMMUNOLOGY | Facility: CLINIC | Age: 65
End: 2025-01-08

## 2025-01-08 ENCOUNTER — APPOINTMENT (OUTPATIENT)
Dept: PEDIATRIC ALLERGY IMMUNOLOGY | Facility: CLINIC | Age: 65
End: 2025-01-08
Payer: COMMERCIAL

## 2025-01-08 VITALS — BODY MASS INDEX: 31.34 KG/M2 | HEART RATE: 91 BPM | WEIGHT: 182.6 LBS

## 2025-01-08 DIAGNOSIS — R74.8 ABNORMAL LEVELS OF OTHER SERUM ENZYMES: ICD-10-CM

## 2025-01-08 DIAGNOSIS — R21 RASH AND OTHER NONSPECIFIC SKIN ERUPTION: ICD-10-CM

## 2025-01-08 DIAGNOSIS — L85.3 XEROSIS CUTIS: ICD-10-CM

## 2025-01-08 DIAGNOSIS — T78.3XXA ANGIONEUROTIC EDEMA, INITIAL ENCOUNTER: ICD-10-CM

## 2025-01-08 DIAGNOSIS — J30.81 ALLERGIC RHINITIS DUE TO ANIMAL (CAT) (DOG) HAIR AND DANDER: ICD-10-CM

## 2025-01-08 DIAGNOSIS — J30.89 OTHER ALLERGIC RHINITIS: ICD-10-CM

## 2025-01-08 DIAGNOSIS — L50.8 OTHER URTICARIA: ICD-10-CM

## 2025-01-08 PROCEDURE — 36415 COLL VENOUS BLD VENIPUNCTURE: CPT

## 2025-01-08 PROCEDURE — 99214 OFFICE O/P EST MOD 30 MIN: CPT | Mod: 25

## 2025-01-08 RX ORDER — TRIAMCINOLONE ACETONIDE 1 MG/G
0.1 CREAM TOPICAL
Qty: 1 | Refills: 1 | Status: ACTIVE | COMMUNITY
Start: 2025-01-08 | End: 1900-01-01

## 2025-01-08 RX ORDER — OMALIZUMAB 300 MG/2ML
300 INJECTION, SOLUTION SUBCUTANEOUS
Qty: 1 | Refills: 10 | Status: ACTIVE | COMMUNITY
Start: 2025-01-08 | End: 1900-01-01

## 2025-01-08 RX ORDER — FLUTICASONE PROPIONATE 50 UG/1
50 SPRAY, METERED NASAL
Qty: 1 | Refills: 5 | Status: ACTIVE | COMMUNITY
Start: 2025-01-08 | End: 1900-01-01

## 2025-01-08 RX ORDER — EPINEPHRINE 0.3 MG/.3ML
0.3 INJECTION INTRAMUSCULAR
Qty: 1 | Refills: 2 | Status: ACTIVE | COMMUNITY
Start: 2025-01-08 | End: 1900-01-01

## 2025-01-08 RX ORDER — FAMOTIDINE 20 MG/1
20 TABLET, FILM COATED ORAL
Qty: 60 | Refills: 2 | Status: ACTIVE | COMMUNITY
Start: 2025-01-08 | End: 1900-01-01

## 2025-01-12 LAB
TRYPTASE: 9.9 UG/L
TSH SERPL-ACNC: 1.3 UIU/ML

## 2025-02-21 ENCOUNTER — NON-APPOINTMENT (OUTPATIENT)
Age: 65
End: 2025-02-21

## 2025-02-25 ENCOUNTER — NON-APPOINTMENT (OUTPATIENT)
Age: 65
End: 2025-02-25

## 2025-04-04 ENCOUNTER — RX RENEWAL (OUTPATIENT)
Age: 65
End: 2025-04-04

## 2025-07-24 ENCOUNTER — APPOINTMENT (OUTPATIENT)
Dept: OPHTHALMOLOGY | Facility: CLINIC | Age: 65
End: 2025-07-24
Payer: COMMERCIAL

## 2025-07-24 ENCOUNTER — NON-APPOINTMENT (OUTPATIENT)
Age: 65
End: 2025-07-24

## 2025-07-24 PROCEDURE — 92134 CPTRZ OPH DX IMG PST SGM RTA: CPT

## 2025-07-24 PROCEDURE — 92004 COMPRE OPH EXAM NEW PT 1/>: CPT
